# Patient Record
Sex: MALE | Race: OTHER | HISPANIC OR LATINO
[De-identification: names, ages, dates, MRNs, and addresses within clinical notes are randomized per-mention and may not be internally consistent; named-entity substitution may affect disease eponyms.]

---

## 2022-12-02 ENCOUNTER — NON-APPOINTMENT (OUTPATIENT)
Age: 22
End: 2022-12-02

## 2022-12-02 PROBLEM — Z00.00 ENCOUNTER FOR PREVENTIVE HEALTH EXAMINATION: Status: ACTIVE | Noted: 2022-12-02

## 2022-12-08 ENCOUNTER — APPOINTMENT (OUTPATIENT)
Dept: GASTROENTEROLOGY | Facility: CLINIC | Age: 22
End: 2022-12-08

## 2022-12-08 VITALS
OXYGEN SATURATION: 98 % | TEMPERATURE: 98 F | HEIGHT: 68 IN | DIASTOLIC BLOOD PRESSURE: 70 MMHG | RESPIRATION RATE: 16 BRPM | SYSTOLIC BLOOD PRESSURE: 110 MMHG | HEART RATE: 70 BPM | WEIGHT: 122 LBS | BODY MASS INDEX: 18.49 KG/M2

## 2022-12-08 DIAGNOSIS — Z78.9 OTHER SPECIFIED HEALTH STATUS: ICD-10-CM

## 2022-12-08 DIAGNOSIS — R17 UNSPECIFIED JAUNDICE: ICD-10-CM

## 2022-12-08 DIAGNOSIS — R10.13 EPIGASTRIC PAIN: ICD-10-CM

## 2022-12-08 PROCEDURE — 99204 OFFICE O/P NEW MOD 45 MIN: CPT

## 2022-12-08 RX ORDER — FAMOTIDINE 20 MG/1
20 TABLET, FILM COATED ORAL
Refills: 0 | Status: ACTIVE | COMMUNITY

## 2022-12-08 NOTE — ASSESSMENT
[FreeTextEntry1] : ARLET HALE is a 22 year year old male with no significant PMH who presents today with elevated bilirubin and epigastric burning.\par \par Pt's bilirubin very slightly elevated at 1.3, otherwise LFTs completely normal. Will repeat LFTs and further fractionate bilirubin. If bilirubin is mostly unconjugated, then pt most likely has Gilbert's Syndrome which is benign and no intervention needed.  Also, the upper limit of normal on this particular lab was 1.2 and therefore the level of 1.3 may be insignificant.\par \par In regards to his epigastric burning, it seems to be induced by poor diet. Discussed decreased intake of acidic, citrus, spicy, greasy and fried foods, chocolate, caffeine, alcohol, and carbonated beverages which can exacerbate symptoms. Continue to take Famotidine 20 mg daily for another month then stop medication completely. Two weeks after stopping the Famotidine, pt will do an H Pylori breath test.\par \par Follow up in 3 months

## 2022-12-08 NOTE — HISTORY OF PRESENT ILLNESS
[FreeTextEntry1] : ARLET HALE is a 22 year year old male with no significant PMH who presents today with elevated bilirubin and epigastric burning.\par \par Pt was referred by his PCP after routine blood work revealed elevated bilirubin. Labs done 11/17/22 - bilirubin 1.3, otherwise LFTs normal, TSH normal, and HCV negative. Denies pruritus, RUQ pain, abdominal distension, jaundice, dark urine, or confusion. Denies ever having elevated bilirubin before. No imaging done.\par \par Pt also has complaints of epigastric burning. He states this started 3-4 months ago. Symptom occurs daily and after meals. Denies nocturnal symptoms. He previously took Gaviscon OTC and more recently was given Famotidine 20 mg daily by his PCP. He states these medications help to completely relieve his symptoms. Denies heartburn, acid regurgitation, unintentional weight loss, nausea, vomiting or dysphagia.

## 2022-12-08 NOTE — PHYSICAL EXAM
[Alert] : alert [Normal Voice/Communication] : normal voice/communication [Healthy Appearing] : healthy appearing [No Acute Distress] : no acute distress [Sclera] : the sclera and conjunctiva were normal [Hearing Threshold Finger Rub Not [place]] : hearing was normal [Normal Lips/Gums] : the lips and gums were normal [Oropharynx] : the oropharynx was normal [Normal Appearance] : the appearance of the neck was normal [No Neck Mass] : no neck mass was observed [No Respiratory Distress] : no respiratory distress [No Acc Muscle Use] : no accessory muscle use [Respiration, Rhythm And Depth] : normal respiratory rhythm and effort [Auscultation Breath Sounds / Voice Sounds] : lungs were clear to auscultation bilaterally [Heart Rate And Rhythm] : heart rate was normal and rhythm regular [Normal S1, S2] : normal S1 and S2 [Murmurs] : no murmurs [Bowel Sounds] : normal bowel sounds [Abdomen Tenderness] : non-tender [No Masses] : no abdominal mass palpated [Abdomen Soft] : soft [] : no hepatosplenomegaly [Oriented To Time, Place, And Person] : oriented to person, place, and time

## 2022-12-08 NOTE — END OF VISIT
[FreeTextEntry3] : In addition to our nurse practitioner I performed a full history and physical and agree with the assessment and plans.

## 2022-12-09 ENCOUNTER — LABORATORY RESULT (OUTPATIENT)
Age: 22
End: 2022-12-09

## 2022-12-09 LAB
ALBUMIN SERPL ELPH-MCNC: 5.4 G/DL
ALP BLD-CCNC: 86 U/L
ALT SERPL-CCNC: 22 U/L
AST SERPL-CCNC: 22 U/L
BILIRUB INDIRECT SERPL-MCNC: 0.7 MG/DL
BILIRUB SERPL-MCNC: 0.9 MG/DL
INR PPP: 1.24 RATIO
PT BLD: 14.4 SEC

## 2023-03-02 ENCOUNTER — APPOINTMENT (OUTPATIENT)
Dept: GASTROENTEROLOGY | Facility: CLINIC | Age: 23
End: 2023-03-02

## 2023-03-02 DIAGNOSIS — A04.8 OTHER SPECIFIED BACTERIAL INTESTINAL INFECTIONS: ICD-10-CM

## 2023-03-02 LAB — UREA BREATH TEST QL: POSITIVE

## 2023-03-02 RX ORDER — AMOXICILLIN 500 MG/1
500 TABLET, FILM COATED ORAL TWICE DAILY
Qty: 56 | Refills: 0 | Status: ACTIVE | COMMUNITY
Start: 2023-03-02 | End: 1900-01-01

## 2023-03-02 RX ORDER — CLARITHROMYCIN 500 MG/1
500 TABLET, FILM COATED ORAL TWICE DAILY
Qty: 28 | Refills: 0 | Status: ACTIVE | COMMUNITY
Start: 2023-03-02 | End: 1900-01-01

## 2023-03-02 RX ORDER — METRONIDAZOLE 500 MG/1
500 TABLET ORAL TWICE DAILY
Qty: 28 | Refills: 0 | Status: ACTIVE | COMMUNITY
Start: 2023-03-02 | End: 1900-01-01

## 2023-03-02 RX ORDER — OMEPRAZOLE 20 MG/1
20 CAPSULE, DELAYED RELEASE ORAL
Qty: 28 | Refills: 0 | Status: ACTIVE | COMMUNITY
Start: 2023-03-02 | End: 1900-01-01